# Patient Record
Sex: FEMALE | Race: BLACK OR AFRICAN AMERICAN | NOT HISPANIC OR LATINO
[De-identification: names, ages, dates, MRNs, and addresses within clinical notes are randomized per-mention and may not be internally consistent; named-entity substitution may affect disease eponyms.]

---

## 2022-07-08 ENCOUNTER — NON-APPOINTMENT (OUTPATIENT)
Age: 32
End: 2022-07-08

## 2022-08-10 ENCOUNTER — APPOINTMENT (OUTPATIENT)
Dept: OBGYN | Facility: CLINIC | Age: 32
End: 2022-08-10

## 2022-08-15 ENCOUNTER — APPOINTMENT (OUTPATIENT)
Dept: OBGYN | Facility: CLINIC | Age: 32
End: 2022-08-15

## 2024-09-24 ENCOUNTER — EMERGENCY (EMERGENCY)
Facility: HOSPITAL | Age: 34
LOS: 1 days | End: 2024-09-24
Admitting: EMERGENCY MEDICINE
Payer: MEDICAID

## 2024-09-24 ENCOUNTER — APPOINTMENT (OUTPATIENT)
Dept: GASTROENTEROLOGY | Facility: CLINIC | Age: 34
End: 2024-09-24

## 2024-09-24 VITALS
HEIGHT: 62 IN | TEMPERATURE: 98 F | WEIGHT: 175.05 LBS | RESPIRATION RATE: 18 BRPM | SYSTOLIC BLOOD PRESSURE: 130 MMHG | DIASTOLIC BLOOD PRESSURE: 86 MMHG | HEART RATE: 68 BPM | OXYGEN SATURATION: 97 %

## 2024-09-24 PROCEDURE — L9991: CPT

## 2025-03-07 ENCOUNTER — EMERGENCY (EMERGENCY)
Facility: HOSPITAL | Age: 35
LOS: 0 days | Discharge: HOME | End: 2025-03-07
Attending: EMERGENCY MEDICINE
Payer: MEDICAID

## 2025-03-07 VITALS
HEART RATE: 79 BPM | SYSTOLIC BLOOD PRESSURE: 115 MMHG | WEIGHT: 169.98 LBS | DIASTOLIC BLOOD PRESSURE: 84 MMHG | RESPIRATION RATE: 20 BRPM | OXYGEN SATURATION: 98 % | HEIGHT: 62 IN | TEMPERATURE: 98 F

## 2025-03-07 DIAGNOSIS — M54.16 RADICULOPATHY, LUMBAR REGION: ICD-10-CM

## 2025-03-07 DIAGNOSIS — M54.50 LOW BACK PAIN, UNSPECIFIED: ICD-10-CM

## 2025-03-07 DIAGNOSIS — Z91.018 ALLERGY TO OTHER FOODS: ICD-10-CM

## 2025-03-07 PROCEDURE — 99284 EMERGENCY DEPT VISIT MOD MDM: CPT

## 2025-03-07 RX ORDER — METHOCARBAMOL 500 MG/1
750 TABLET, FILM COATED ORAL ONCE
Refills: 0 | Status: DISCONTINUED | OUTPATIENT
Start: 2025-03-07 | End: 2025-03-07

## 2025-03-07 RX ORDER — LIDOCAINE HYDROCHLORIDE 20 MG/ML
1 JELLY TOPICAL ONCE
Refills: 0 | Status: DISCONTINUED | OUTPATIENT
Start: 2025-03-07 | End: 2025-03-07

## 2025-03-07 RX ORDER — KETOROLAC TROMETHAMINE 30 MG/ML
30 INJECTION, SOLUTION INTRAMUSCULAR; INTRAVENOUS ONCE
Refills: 0 | Status: COMPLETED | OUTPATIENT
Start: 2025-03-07 | End: 2025-03-07

## 2025-03-07 NOTE — ED PROVIDER NOTE - NSFOLLOWUPINSTRUCTIONS_ED_ALL_ED_FT
Please follow up with Dr. Heredia spinal doctor for our patient mri of LS spines, returned if uncontrolled urination or bowel movements, or focal/distal weakness or numbness.

## 2025-03-07 NOTE — ED ADULT TRIAGE NOTE - CHIEF COMPLAINT QUOTE
Right leg tingling sensation since yesterday denies trauma, pain in right thigh and knee H/O Hypothyroid acid reflux  LMP 13 yrs ago

## 2025-03-07 NOTE — ED ADULT NURSE NOTE - NSFALLRISKINTERV_ED_ALL_ED

## 2025-03-07 NOTE — ED ADULT NURSE NOTE - OBJECTIVE STATEMENT
34 year old female A/Ox4 c/o right leg numbness and tingling x yesterday, pt denies falls/trauma, pt reports pain in the right knee radiating upward to the right thigh, pt reports hx of hypothyroidism and acid reflux

## 2025-03-07 NOTE — ED PROVIDER NOTE - CLINICAL SUMMARY MEDICAL DECISION MAKING FREE TEXT BOX
34 years old female walked ihn  here c/o right lower back pain radiates down to right thigh and right knee increases pain to stand up from the sitting position for two days. Pt denies focal/distal weakness or numbness, uncontrolled urination or bowel movements. recent hx of trauma, headache, dizziness. neck/back/hips/calfs pain., difficulty to walk, sob, chest pain, nausea, vomiting, fever, chills, abd pain, dysuria, vaginal spotting or discharge or irregular bowel movements Pt last took motrin yesterday. Pt sts she is absolutely not at risk of being pregnant. Pt appears comfortable sitting in the chair but having difficulty to straight her back while standing up. Pt has no recent hx of trauma no xray of LS spines or right knee are indicated. Pt is ambulating with normal gaits without assists.

## 2025-03-07 NOTE — ED PROVIDER NOTE - CONSTITUTIONAL, MLM
Well appearing, awake, alert, oriented to person, place, time/situation and in no apparent distress. Speaking in clear full sentences smiling appears comfortable sitting in the chair but uncomfortable try to stand up from the chair normal...

## 2025-03-07 NOTE — ED PROVIDER NOTE - PATIENT PORTAL LINK FT
You can access the FollowMyHealth Patient Portal offered by [facility] by registering at the following website: [email or web address]. By joining [facility]’s FollowMyHealth portal, you will also be able to view your health information using other applications (apps) compatible with our system.

## 2025-04-06 ENCOUNTER — EMERGENCY (EMERGENCY)
Facility: HOSPITAL | Age: 35
LOS: 0 days | Discharge: HOME | End: 2025-04-06
Attending: STUDENT IN AN ORGANIZED HEALTH CARE EDUCATION/TRAINING PROGRAM
Payer: MEDICAID

## 2025-04-06 VITALS
SYSTOLIC BLOOD PRESSURE: 125 MMHG | OXYGEN SATURATION: 98 % | TEMPERATURE: 98 F | HEART RATE: 85 BPM | RESPIRATION RATE: 18 BRPM | DIASTOLIC BLOOD PRESSURE: 84 MMHG

## 2025-04-06 VITALS
SYSTOLIC BLOOD PRESSURE: 136 MMHG | HEIGHT: 62 IN | RESPIRATION RATE: 15 BRPM | HEART RATE: 77 BPM | TEMPERATURE: 98 F | DIASTOLIC BLOOD PRESSURE: 87 MMHG | OXYGEN SATURATION: 99 % | WEIGHT: 175.05 LBS

## 2025-04-06 DIAGNOSIS — R09.81 NASAL CONGESTION: ICD-10-CM

## 2025-04-06 DIAGNOSIS — R51.9 HEADACHE, UNSPECIFIED: ICD-10-CM

## 2025-04-06 DIAGNOSIS — R53.83 OTHER FATIGUE: ICD-10-CM

## 2025-04-06 DIAGNOSIS — Z91.018 ALLERGY TO OTHER FOODS: ICD-10-CM

## 2025-04-06 DIAGNOSIS — I10 ESSENTIAL (PRIMARY) HYPERTENSION: ICD-10-CM

## 2025-04-06 LAB
ALBUMIN SERPL ELPH-MCNC: 3.7 G/DL — SIGNIFICANT CHANGE UP (ref 3.3–5)
ALP SERPL-CCNC: 79 U/L — SIGNIFICANT CHANGE UP (ref 40–120)
ALT FLD-CCNC: 24 U/L — SIGNIFICANT CHANGE UP (ref 12–78)
ANION GAP SERPL CALC-SCNC: 8 MMOL/L — SIGNIFICANT CHANGE UP (ref 5–17)
AST SERPL-CCNC: 20 U/L — SIGNIFICANT CHANGE UP (ref 15–37)
BASOPHILS # BLD AUTO: 0.03 K/UL — SIGNIFICANT CHANGE UP (ref 0–0.2)
BASOPHILS NFR BLD AUTO: 0.5 % — SIGNIFICANT CHANGE UP (ref 0–2)
BILIRUB SERPL-MCNC: 0.3 MG/DL — SIGNIFICANT CHANGE UP (ref 0.2–1.2)
BUN SERPL-MCNC: 12 MG/DL — SIGNIFICANT CHANGE UP (ref 7–23)
CALCIUM SERPL-MCNC: 9.2 MG/DL — SIGNIFICANT CHANGE UP (ref 8.5–10.1)
CHLORIDE SERPL-SCNC: 106 MMOL/L — SIGNIFICANT CHANGE UP (ref 96–108)
CO2 SERPL-SCNC: 24 MMOL/L — SIGNIFICANT CHANGE UP (ref 22–31)
CREAT SERPL-MCNC: 0.7 MG/DL — SIGNIFICANT CHANGE UP (ref 0.5–1.3)
EGFR: 115 ML/MIN/1.73M2 — SIGNIFICANT CHANGE UP
EGFR: 115 ML/MIN/1.73M2 — SIGNIFICANT CHANGE UP
EOSINOPHIL # BLD AUTO: 0.08 K/UL — SIGNIFICANT CHANGE UP (ref 0–0.5)
EOSINOPHIL NFR BLD AUTO: 1.4 % — SIGNIFICANT CHANGE UP (ref 0–6)
GLUCOSE SERPL-MCNC: 84 MG/DL — SIGNIFICANT CHANGE UP (ref 70–99)
HCG SERPL-ACNC: 1 MIU/ML — SIGNIFICANT CHANGE UP
HCT VFR BLD CALC: 35.9 % — SIGNIFICANT CHANGE UP (ref 34.5–45)
HGB BLD-MCNC: 11.6 G/DL — SIGNIFICANT CHANGE UP (ref 11.5–15.5)
IMM GRANULOCYTES NFR BLD AUTO: 0.3 % — SIGNIFICANT CHANGE UP (ref 0–0.9)
LYMPHOCYTES # BLD AUTO: 1.85 K/UL — SIGNIFICANT CHANGE UP (ref 1–3.3)
LYMPHOCYTES # BLD AUTO: 32.2 % — SIGNIFICANT CHANGE UP (ref 13–44)
MCHC RBC-ENTMCNC: 28.6 PG — SIGNIFICANT CHANGE UP (ref 27–34)
MCHC RBC-ENTMCNC: 32.3 G/DL — SIGNIFICANT CHANGE UP (ref 32–36)
MCV RBC AUTO: 88.4 FL — SIGNIFICANT CHANGE UP (ref 80–100)
MONOCYTES # BLD AUTO: 0.41 K/UL — SIGNIFICANT CHANGE UP (ref 0–0.9)
MONOCYTES NFR BLD AUTO: 7.1 % — SIGNIFICANT CHANGE UP (ref 2–14)
NEUTROPHILS # BLD AUTO: 3.35 K/UL — SIGNIFICANT CHANGE UP (ref 1.8–7.4)
NEUTROPHILS NFR BLD AUTO: 58.5 % — SIGNIFICANT CHANGE UP (ref 43–77)
NRBC BLD AUTO-RTO: 0 /100 WBCS — SIGNIFICANT CHANGE UP (ref 0–0)
PLATELET # BLD AUTO: 314 K/UL — SIGNIFICANT CHANGE UP (ref 150–400)
POTASSIUM SERPL-MCNC: 3.6 MMOL/L — SIGNIFICANT CHANGE UP (ref 3.5–5.3)
POTASSIUM SERPL-SCNC: 3.6 MMOL/L — SIGNIFICANT CHANGE UP (ref 3.5–5.3)
PROT SERPL-MCNC: 7.9 GM/DL — SIGNIFICANT CHANGE UP (ref 6–8.3)
RBC # BLD: 4.06 M/UL — SIGNIFICANT CHANGE UP (ref 3.8–5.2)
RBC # FLD: 12 % — SIGNIFICANT CHANGE UP (ref 10.3–14.5)
SODIUM SERPL-SCNC: 138 MMOL/L — SIGNIFICANT CHANGE UP (ref 135–145)
WBC # BLD: 5.74 K/UL — SIGNIFICANT CHANGE UP (ref 3.8–10.5)
WBC # FLD AUTO: 5.74 K/UL — SIGNIFICANT CHANGE UP (ref 3.8–10.5)

## 2025-04-06 PROCEDURE — 99284 EMERGENCY DEPT VISIT MOD MDM: CPT

## 2025-04-06 RX ORDER — METOCLOPRAMIDE HCL 10 MG
10 TABLET ORAL ONCE
Refills: 0 | Status: COMPLETED | OUTPATIENT
Start: 2025-04-06 | End: 2025-04-06

## 2025-04-06 RX ORDER — ACETAMINOPHEN 500 MG/5ML
650 LIQUID (ML) ORAL ONCE
Refills: 0 | Status: COMPLETED | OUTPATIENT
Start: 2025-04-06 | End: 2025-04-06

## 2025-04-06 RX ADMIN — Medication 650 MILLIGRAM(S): at 19:00

## 2025-04-06 RX ADMIN — Medication 1000 MILLILITER(S): at 19:00

## 2025-04-06 RX ADMIN — Medication 10 MILLIGRAM(S): at 19:41

## 2025-04-06 NOTE — ED PROVIDER NOTE - IV ALTEPLASE EXCL ABS HIDDEN
show
review of patient information including recent vital signs, labs, imaging, and notes; assessing, examining patient; updating patient/family; discussion and coordination of care with multidisciplinary team.

## 2025-04-06 NOTE — ED PROVIDER NOTE - PROGRESS NOTE DETAILS
MD Bety: Patient received on sign-out from Dr. Jiménez after headache over frontal portion of head. no nausea, vomiting, photophobia, phonophobia or trouble walking. + congestion. pt rewuesting covid testing, PCR swab sent, patient feeling better after reglan. tension headache similar to prior headaches likely in setting of recent viral infection. will d/c home w/ return precautions and outpatient follow-up

## 2025-04-06 NOTE — ED PROVIDER NOTE - PATIENT PORTAL LINK FT
You can access the FollowMyHealth Patient Portal offered by [facility] by registering at the following website: [email or web address]. By joining [facility]’s FollowMyHealth portal, you will also be able to view your health information using other applications (apps) compatible with our system.

## 2025-04-06 NOTE — ED PROVIDER NOTE - CLINICAL SUMMARY MEDICAL DECISION MAKING FREE TEXT BOX
Patient with hx of headaches that are intermittent  Patient stated that headache started slowly this morning and grew in intensity over the day until she had her BP checked and noted it was elevated  Patient states she has not been drinking enough water    Patient states that her current headache is consistent with her normal headaches  Patient denies neck pain, nausea, vomiting. Denies vision change, denies sensitivity to light  Patient feels better after first round of meds  No lab abnormalities    However headache is still persisting  Will give reglan  Patient does not have red flag symptoms for SAH given headache was gradual onset, not described as "thunderclap like", patient has no neck pain, no AMS, no confusion, no LOC    [place] [facility] rules patient is cleared for SAH   Signed out pending second round of medication Patient with hx of headaches that are intermittent  Patient stated that headache started slowly this morning and grew in intensity over the day until she had her BP checked and noted it was elevated  Patient states she has not been drinking enough water    Patient states that her current headache is consistent with her normal headaches  Patient denies neck pain, nausea, vomiting. Denies vision change, denies sensitivity to light  Patient feels better after first round of meds  No lab abnormalities    However headache is still persisting  Will give reglan  Patient does not have red flag symptoms for SAH given headache was gradual onset, not described as "thunderclap like", patient has no neck pain, no AMS, no confusion, no LOC    [place] [facility] rules patient is cleared for SAH, also low clinical suspicion for SAH given slow onset and lack of red-flag symptoms.   Signed out pending second round of medication and disposition.

## 2025-04-06 NOTE — ED ADULT NURSE NOTE - NS ED NURSE DISCH DISPOSITION
Requested medication(s) are due for refill today: Yes  Patient has already received a courtesy refill: No  Other reason request has been forwarded to provider: Discharged

## 2025-04-06 NOTE — ED PROVIDER NOTE - OBJECTIVE STATEMENT
34 year old female here with concern for headache and HTN. Patient states headache was slow onset over the past two days, patient has a history of headaches, patient also endorses some URI symptoms. Patient states she was feeling fatigued and checked BP at work which was 150 systolic. Patient denies any neck pain, nausea, vomiting, photophobia, dizziness. Patient denies any recent trauma, patient denies any anticoagulant use.

## 2025-04-06 NOTE — ED ADULT NURSE NOTE - OBJECTIVE STATEMENT
34yF A&Ox4 presenting to ED with elevated bp of  150/93 this afternoon and h/a. PMH hypothyroidism and GERD. pt denies chest pain, sob, dizziness, weakness, blurred vision, N+T, increased work of breathing, abd pain, N+V+D, back pain, recent fever/cough,  symptoms.

## 2025-04-06 NOTE — ED ADULT NURSE NOTE - NSFALLUNIVINTERV_ED_ALL_ED
Bed/Stretcher in lowest position, wheels locked, appropriate side rails in place/Call bell, personal items and telephone in reach/Instruct patient to call for assistance before getting out of bed/chair/stretcher/Non-slip footwear applied when patient is off stretcher/[place] to call system/Physically safe environment - no spills, clutter or unnecessary equipment/Purposeful proactive rounding/Room/bathroom lighting operational, light cord in reach

## 2025-04-07 PROBLEM — E03.9 HYPOTHYROIDISM, UNSPECIFIED: Chronic | Status: ACTIVE | Noted: 2025-03-07

## 2025-08-26 ENCOUNTER — EMERGENCY (EMERGENCY)
Facility: HOSPITAL | Age: 35
LOS: 1 days | End: 2025-08-26
Attending: EMERGENCY MEDICINE | Admitting: EMERGENCY MEDICINE
Payer: MEDICAID

## 2025-08-26 VITALS
HEART RATE: 82 BPM | OXYGEN SATURATION: 98 % | SYSTOLIC BLOOD PRESSURE: 147 MMHG | TEMPERATURE: 98 F | RESPIRATION RATE: 17 BRPM | DIASTOLIC BLOOD PRESSURE: 90 MMHG | HEIGHT: 62 IN | WEIGHT: 175.05 LBS

## 2025-08-26 VITALS
HEART RATE: 66 BPM | SYSTOLIC BLOOD PRESSURE: 121 MMHG | TEMPERATURE: 98 F | RESPIRATION RATE: 17 BRPM | DIASTOLIC BLOOD PRESSURE: 82 MMHG | OXYGEN SATURATION: 100 %

## 2025-08-26 PROCEDURE — 99284 EMERGENCY DEPT VISIT MOD MDM: CPT

## 2025-08-26 PROCEDURE — 93010 ELECTROCARDIOGRAM REPORT: CPT

## 2025-08-26 PROCEDURE — 71046 X-RAY EXAM CHEST 2 VIEWS: CPT | Mod: 26

## 2025-08-26 RX ORDER — LIDOCAINE HYDROCHLORIDE 20 MG/ML
5 JELLY TOPICAL ONCE
Refills: 0 | Status: COMPLETED | OUTPATIENT
Start: 2025-08-26 | End: 2025-08-26

## 2025-08-26 RX ORDER — ACETAMINOPHEN 500 MG/5ML
650 LIQUID (ML) ORAL ONCE
Refills: 0 | Status: COMPLETED | OUTPATIENT
Start: 2025-08-26 | End: 2025-08-26

## 2025-08-26 RX ORDER — MAGNESIUM, ALUMINUM HYDROXIDE 200-200 MG
30 TABLET,CHEWABLE ORAL ONCE
Refills: 0 | Status: COMPLETED | OUTPATIENT
Start: 2025-08-26 | End: 2025-08-26

## 2025-08-26 RX ADMIN — LIDOCAINE HYDROCHLORIDE 5 MILLILITER(S): 20 JELLY TOPICAL at 04:47

## 2025-08-26 RX ADMIN — Medication 650 MILLIGRAM(S): at 04:48

## 2025-08-26 RX ADMIN — Medication 30 MILLILITER(S): at 04:47

## 2025-08-26 RX ADMIN — Medication 650 MILLIGRAM(S): at 05:20
